# Patient Record
Sex: MALE | Race: WHITE | Employment: FULL TIME | URBAN - METROPOLITAN AREA
[De-identification: names, ages, dates, MRNs, and addresses within clinical notes are randomized per-mention and may not be internally consistent; named-entity substitution may affect disease eponyms.]

---

## 2017-08-04 ENCOUNTER — APPOINTMENT (OUTPATIENT)
Dept: CT IMAGING | Age: 22
End: 2017-08-04
Attending: PHYSICIAN ASSISTANT
Payer: SELF-PAY

## 2017-08-04 ENCOUNTER — HOSPITAL ENCOUNTER (OUTPATIENT)
Age: 22
Setting detail: OBSERVATION
Discharge: HOME | End: 2017-08-05
Attending: EMERGENCY MEDICINE | Admitting: SURGERY
Payer: SELF-PAY

## 2017-08-04 DIAGNOSIS — K81.0 CHOLECYSTITIS, ACUTE: Primary | ICD-10-CM

## 2017-08-04 DIAGNOSIS — D72.829 LEUKOCYTOSIS, UNSPECIFIED TYPE: ICD-10-CM

## 2017-08-04 PROBLEM — K81.9 CHOLECYSTITIS: Status: ACTIVE | Noted: 2017-08-04

## 2017-08-04 LAB
ALBUMIN SERPL BCP-MCNC: 4.6 G/DL (ref 3.4–5)
ALBUMIN/GLOB SERPL: 1.5 {RATIO} (ref 0.8–1.7)
ALP SERPL-CCNC: 63 U/L (ref 45–117)
ALT SERPL-CCNC: 23 U/L (ref 16–61)
AMPHET UR QL SCN: NEGATIVE
ANION GAP BLD CALC-SCNC: 18 MMOL/L (ref 3–18)
APPEARANCE UR: CLEAR
AST SERPL W P-5'-P-CCNC: 23 U/L (ref 15–37)
BACTERIA URNS QL MICRO: NEGATIVE /HPF
BARBITURATES UR QL SCN: NEGATIVE
BASOPHILS # BLD AUTO: 0 K/UL (ref 0–0.06)
BASOPHILS # BLD: 0 % (ref 0–2)
BENZODIAZ UR QL: NEGATIVE
BILIRUB SERPL-MCNC: 0.6 MG/DL (ref 0.2–1)
BILIRUB UR QL: NEGATIVE
BUN SERPL-MCNC: 11 MG/DL (ref 7–18)
BUN/CREAT SERPL: 7 (ref 12–20)
CALCIUM SERPL-MCNC: 9.4 MG/DL (ref 8.5–10.1)
CANNABINOIDS UR QL SCN: POSITIVE
CHLORIDE SERPL-SCNC: 103 MMOL/L (ref 100–108)
CK MB CFR SERPL CALC: 0.4 % (ref 0–4)
CK MB SERPL-MCNC: 1.4 NG/ML (ref 5–25)
CK SERPL-CCNC: 337 U/L (ref 39–308)
CO2 SERPL-SCNC: 23 MMOL/L (ref 21–32)
COCAINE UR QL SCN: NEGATIVE
COLOR UR: YELLOW
CREAT SERPL-MCNC: 1.5 MG/DL (ref 0.6–1.3)
DIFFERENTIAL METHOD BLD: ABNORMAL
EOSINOPHIL # BLD: 0 K/UL (ref 0–0.4)
EOSINOPHIL NFR BLD: 0 % (ref 0–5)
EPITH CASTS URNS QL MICRO: NORMAL /LPF (ref 0–5)
ERYTHROCYTE [DISTWIDTH] IN BLOOD BY AUTOMATED COUNT: 12.4 % (ref 11.6–14.5)
GLOBULIN SER CALC-MCNC: 3.1 G/DL (ref 2–4)
GLUCOSE SERPL-MCNC: 142 MG/DL (ref 74–99)
GLUCOSE UR STRIP.AUTO-MCNC: NEGATIVE MG/DL
HCT VFR BLD AUTO: 39.2 % (ref 36–48)
HDSCOM,HDSCOM: ABNORMAL
HGB BLD-MCNC: 14.2 G/DL (ref 13–16)
HGB UR QL STRIP: NEGATIVE
KETONES UR QL STRIP.AUTO: 40 MG/DL
LACTATE SERPL-SCNC: 1.1 MMOL/L (ref 0.4–2)
LACTATE SERPL-SCNC: 6.6 MMOL/L (ref 0.4–2)
LEUKOCYTE ESTERASE UR QL STRIP.AUTO: ABNORMAL
LIPASE SERPL-CCNC: 64 U/L (ref 73–393)
LYMPHOCYTES # BLD AUTO: 4 % (ref 21–52)
LYMPHOCYTES # BLD: 0.7 K/UL (ref 0.9–3.6)
MAGNESIUM SERPL-MCNC: 1.5 MG/DL (ref 1.6–2.6)
MCH RBC QN AUTO: 32.1 PG (ref 24–34)
MCHC RBC AUTO-ENTMCNC: 36.2 G/DL (ref 31–37)
MCV RBC AUTO: 88.5 FL (ref 74–97)
METHADONE UR QL: NEGATIVE
MONOCYTES # BLD: 1.9 K/UL (ref 0.05–1.2)
MONOCYTES NFR BLD AUTO: 10 % (ref 3–10)
NEUTS SEG # BLD: 16.2 K/UL (ref 1.8–8)
NEUTS SEG NFR BLD AUTO: 86 % (ref 40–73)
NITRITE UR QL STRIP.AUTO: NEGATIVE
OPIATES UR QL: POSITIVE
PCP UR QL: NEGATIVE
PH UR STRIP: 8.5 [PH] (ref 5–8)
PLATELET # BLD AUTO: 258 K/UL (ref 135–420)
PMV BLD AUTO: 9.7 FL (ref 9.2–11.8)
POTASSIUM SERPL-SCNC: 3.6 MMOL/L (ref 3.5–5.5)
PROT SERPL-MCNC: 7.7 G/DL (ref 6.4–8.2)
PROT UR STRIP-MCNC: 30 MG/DL
RBC # BLD AUTO: 4.43 M/UL (ref 4.7–5.5)
RBC #/AREA URNS HPF: NEGATIVE /HPF (ref 0–5)
SODIUM SERPL-SCNC: 144 MMOL/L (ref 136–145)
SP GR UR REFRACTOMETRY: 1.02 (ref 1–1.03)
TROPONIN I SERPL-MCNC: <0.02 NG/ML (ref 0–0.06)
UROBILINOGEN UR QL STRIP.AUTO: 1 EU/DL (ref 0.2–1)
WBC # BLD AUTO: 18.7 K/UL (ref 4.6–13.2)
WBC URNS QL MICRO: NORMAL /HPF (ref 0–5)

## 2017-08-04 PROCEDURE — 99285 EMERGENCY DEPT VISIT HI MDM: CPT

## 2017-08-04 PROCEDURE — 83605 ASSAY OF LACTIC ACID: CPT | Performed by: PHYSICIAN ASSISTANT

## 2017-08-04 PROCEDURE — 81001 URINALYSIS AUTO W/SCOPE: CPT | Performed by: PHYSICIAN ASSISTANT

## 2017-08-04 PROCEDURE — 83735 ASSAY OF MAGNESIUM: CPT | Performed by: PHYSICIAN ASSISTANT

## 2017-08-04 PROCEDURE — 96367 TX/PROPH/DG ADDL SEQ IV INF: CPT

## 2017-08-04 PROCEDURE — 74011250636 HC RX REV CODE- 250/636: Performed by: PHYSICIAN ASSISTANT

## 2017-08-04 PROCEDURE — 74011636320 HC RX REV CODE- 636/320: Performed by: EMERGENCY MEDICINE

## 2017-08-04 PROCEDURE — 83690 ASSAY OF LIPASE: CPT | Performed by: PHYSICIAN ASSISTANT

## 2017-08-04 PROCEDURE — 96365 THER/PROPH/DIAG IV INF INIT: CPT

## 2017-08-04 PROCEDURE — 93005 ELECTROCARDIOGRAM TRACING: CPT

## 2017-08-04 PROCEDURE — 82550 ASSAY OF CK (CPK): CPT | Performed by: PHYSICIAN ASSISTANT

## 2017-08-04 PROCEDURE — 74011000258 HC RX REV CODE- 258: Performed by: PHYSICIAN ASSISTANT

## 2017-08-04 PROCEDURE — 96361 HYDRATE IV INFUSION ADD-ON: CPT

## 2017-08-04 PROCEDURE — 85025 COMPLETE CBC W/AUTO DIFF WBC: CPT | Performed by: PHYSICIAN ASSISTANT

## 2017-08-04 PROCEDURE — 74177 CT ABD & PELVIS W/CONTRAST: CPT

## 2017-08-04 PROCEDURE — 96375 TX/PRO/DX INJ NEW DRUG ADDON: CPT

## 2017-08-04 PROCEDURE — 80053 COMPREHEN METABOLIC PANEL: CPT | Performed by: PHYSICIAN ASSISTANT

## 2017-08-04 PROCEDURE — 80307 DRUG TEST PRSMV CHEM ANLYZR: CPT | Performed by: PHYSICIAN ASSISTANT

## 2017-08-04 PROCEDURE — 87040 BLOOD CULTURE FOR BACTERIA: CPT | Performed by: PHYSICIAN ASSISTANT

## 2017-08-04 RX ORDER — MAGNESIUM SULFATE HEPTAHYDRATE 40 MG/ML
2 INJECTION, SOLUTION INTRAVENOUS ONCE
Status: COMPLETED | OUTPATIENT
Start: 2017-08-04 | End: 2017-08-04

## 2017-08-04 RX ORDER — ONDANSETRON 2 MG/ML
4 INJECTION INTRAMUSCULAR; INTRAVENOUS
Status: DISCONTINUED | OUTPATIENT
Start: 2017-08-04 | End: 2017-08-05 | Stop reason: HOSPADM

## 2017-08-04 RX ORDER — SODIUM CHLORIDE 9 MG/ML
125 INJECTION, SOLUTION INTRAVENOUS CONTINUOUS
Status: DISCONTINUED | OUTPATIENT
Start: 2017-08-04 | End: 2017-08-05 | Stop reason: HOSPADM

## 2017-08-04 RX ORDER — ONDANSETRON 2 MG/ML
4 INJECTION INTRAMUSCULAR; INTRAVENOUS
Status: COMPLETED | OUTPATIENT
Start: 2017-08-04 | End: 2017-08-04

## 2017-08-04 RX ORDER — MORPHINE SULFATE 2 MG/ML
2 INJECTION, SOLUTION INTRAMUSCULAR; INTRAVENOUS
Status: DISCONTINUED | OUTPATIENT
Start: 2017-08-04 | End: 2017-08-05 | Stop reason: HOSPADM

## 2017-08-04 RX ORDER — HYDROMORPHONE HYDROCHLORIDE 1 MG/ML
1 INJECTION, SOLUTION INTRAMUSCULAR; INTRAVENOUS; SUBCUTANEOUS
Status: COMPLETED | OUTPATIENT
Start: 2017-08-04 | End: 2017-08-04

## 2017-08-04 RX ADMIN — SODIUM CHLORIDE 1000 ML: 900 INJECTION, SOLUTION INTRAVENOUS at 18:39

## 2017-08-04 RX ADMIN — MAGNESIUM SULFATE HEPTAHYDRATE 2 G: 40 INJECTION, SOLUTION INTRAVENOUS at 21:04

## 2017-08-04 RX ADMIN — ONDANSETRON 4 MG: 2 INJECTION INTRAMUSCULAR; INTRAVENOUS at 18:13

## 2017-08-04 RX ADMIN — SODIUM CHLORIDE 1000 ML: 900 INJECTION, SOLUTION INTRAVENOUS at 18:14

## 2017-08-04 RX ADMIN — SODIUM CHLORIDE 1000 ML: 900 INJECTION, SOLUTION INTRAVENOUS at 21:04

## 2017-08-04 RX ADMIN — IOPAMIDOL 100 ML: 612 INJECTION, SOLUTION INTRAVENOUS at 21:40

## 2017-08-04 RX ADMIN — PIPERACILLIN SODIUM,TAZOBACTAM SODIUM 3.38 G: 3; .375 INJECTION, POWDER, FOR SOLUTION INTRAVENOUS at 21:05

## 2017-08-04 RX ADMIN — HYDROMORPHONE HYDROCHLORIDE 1 MG: 1 INJECTION, SOLUTION INTRAMUSCULAR; INTRAVENOUS; SUBCUTANEOUS at 18:13

## 2017-08-04 NOTE — IP AVS SNAPSHOT
Radha Gaspar 
 
 
 [address] [place] [id number] 
[phone] Patient: Jean Crowe MRN: [id number] :1995 You are allergic to the following No active allergies Recent Documentation Height Weight BMI Smoking Status 1.854 m (89 %, Z= 1.21)* 72.6 kg (57 %, Z= 0.17)* 21.11 kg/m2 (24 %, Z= -0.69)* Never Smoker *Growth percentiles are based on CDC 2-20 Years data. Unresulted Labs Order Current Status CULTURE, BLOOD Preliminary result Emergency Contacts Name Discharge Info Relation Home Work Mobile Preston CAREGIVER [3] Parent [1] [phone] About your hospitalization You were admitted on:  2017 You last received care in the:  [address] [place] [address] You were discharged on:  2017 Unit phone number:  [phone] Why you were hospitalized Your primary diagnosis was:  Not on File Your diagnoses also included:  Cholecystitis Providers Seen During Your Hospitalizations Provider Role Specialty Primary office phone Latonya Florez MD Attending Provider Emergency Medicine [phone] Wiley Pena DO Attending Provider Colon and Rectal Surgery [phone] Your Primary Care Physician (PCP) Primary Care Physician Office Phone Office Fax OTHER, PHYS ** None ** ** None ** Follow-up Information Follow up With Details Comments Contact Info Wiley Pena DO Call to make plans for further care [address] [place] [address] 
[phone] Current Discharge Medication List  
  
START taking these medications Dose & Instructions Dispensing Information Comments Morning Noon Evening Bedtime  
 amoxicillin-clavulanate 875-125 mg per tablet Commonly known as:  AUGMENTIN Your last dose was: Your next dose is:    
   
   
 Dose:  1 Tab Take 1 Tab by mouth two (2) times a day for 10 days. Indications: intra-abdominal infection Quantity:  20 Tab Refills:  0 HYDROcodone-acetaminophen 7.5-325 mg per tablet Commonly known as:  Dayo Vinson Your last dose was: Your next dose is:    
   
   
 Dose:  1 Tab Take 1 Tab by mouth every four (4) hours for 15 doses. Max Daily Amount: 6 Tabs. Quantity:  15 Tab Refills:  0 CONTINUE these medications which have NOT CHANGED Dose & Instructions Dispensing Information Comments Morning Noon Evening Bedtime  
 acetaminophen-codeine 300-30 mg per tablet Commonly known as:  TYLENOL-CODEINE #3 Your last dose was: Your next dose is:    
   
   
 Dose:  1 Tab Take 1 tablet by mouth every six (6) hours as needed for Pain. Quantity:  12 tablet Refills:  0  
     
   
   
   
  
 ADDERALL PO Your last dose was: Your next dose is: Take  by mouth. Refills:  0 Where to Get Your Medications Information on where to get these meds will be given to you by the nurse or doctor. ! Ask your nurse or doctor about these medications  
  amoxicillin-clavulanate 875-125 mg per tablet HYDROcodone-acetaminophen 7.5-325 mg per tablet Discharge Instructions Post-Operative Discharge Instructions Rogelio Ram DO, JAZMIN, [facility] [address] [place] [address] [facility] 
[phone] Patient: Diane Calero MRN: [id number]  CSN: [id number] YOB: 1995  Age: 25 y.o. Sex: male DOA: 8/4/2017 LOS:  LOS: 0 days   Discharge Date:   
 
Acute Diagnoses: 
Abdominal Pain NOS Chronic Medical Diagnoses: 
Problem List as of 8/5/2017  Date Reviewed: 8/5/2017 Codes Class Noted - Resolved Cholecystitis ICD-10-CM: K81.9 ICD-9-CM: 575.10  8/4/2017 - Present Diet 1. Resume prior to surgery diet as tolerated. Medications 1. It is important to take your medications exactly as they are prescribed. 2. Keep your medication in the bottles provided by the pharmacist, and keep a list of the medication names, dosages, and times they should be taken in your wallet. Call 911 anytime you think you may need emergency care. For example, call if: 
· You passed out (lost consciousness). · You have severe trouble breathing. · You have sudden chest pain and shortness of breath. Notify your Surgeon for any of the followin. Fever, chills, nausea, vomiting, severe abdominal pain or bleeding. 2. If you experience any redness or discharge or sign of infection. 3. Persistent nausea lasting more than 24 hours. If you are unable to reach your Surgeon for any of the symptoms above, you should proceed directly to the nearest Emergency Department. Post-Operative Appointment Information Call Dr. Rubén San office at [phone] to schedule an  office visit in two (2) weeks. If any questions or concerns arise, call your Surgeon at [phone]. DISCHARGE SUMMARY from Nurse The following personal items are in your possession at time of discharge: 
 
Dental Appliances: None Visual Aid: None Home Medications: None Jewelry: None Clothing: Shorts, Shirt, Footwear, Undergarments, With patient Other Valuables: Cell Phone PATIENT INSTRUCTIONS: 
 
 
F-face looks uneven A-arms unable to move or move unevenly S-speech slurred or non-existent T-time-call 911 as soon as signs and symptoms begin-DO NOT go Back to bed or wait to see if you get better-TIME IS BRAIN. Warning Signs of HEART ATTACK Call 911 if you have these symptoms: 
? Chest discomfort. Most heart attacks involve discomfort in the center of the chest that lasts more than a few minutes, or that goes away and comes back. It can feel like uncomfortable pressure, squeezing, fullness, or pain. ? Discomfort in other areas of the upper body. Symptoms can include pain or discomfort in one or both arms, the back, neck, jaw, or stomach. ? Shortness of breath with or without chest discomfort. ? Other signs may include breaking out in a cold sweat, nausea, or lightheadedness. Don't wait more than five minutes to call [address]! Fast action can save your life. Calling 911 is almost always the fastest way to get lifesaving treatment. Emergency Medical Services staff can begin treatment when they arrive  up to an hour sooner than if someone gets to the hospital by car. The discharge information has been reviewed with the patient and parent. The patient and parent verbalized understanding. Discharge medications reviewed with the patient and appropriate educational materials and side effects teaching were provided. Patient armband removed and shredded Discharge Instructions Attachments/References ABDOMINAL PAIN (ENGLISH) Discharge Orders None [facility] Announcement We are excited to announce that we are making your provider's discharge notes available to you in [facility]. You will see these notes when they are completed and signed by the physician that discharged you from your recent hospital stay. If you have any questions or concerns about any information you see in [facility], please call the Health Information Department where you were seen or reach out to your Primary Care Provider for more information about your plan of care. Introducing [facility] & [facility] SERVICES!    
 [employer] introduces [facility] patient portal. Now you can access parts of your medical record, email your doctor's office, and request medication refills online. 1. In your internet browser, go to [email or web address] 2. Click on the First Time User? Click Here link in the Sign In box. You will see the New Member Sign Up page. 3. Enter your [employer] Access Code exactly as it appears below. You will not need to use this code after youve completed the sign-up process. If you do not sign up before the expiration date, you must request a new code. · [employer] Access Code: [email or web address] Expires: 11/3/2017  4:27 PM 
 
4. Enter the last four digits of your Social Security Number (xxxx) and Date of Birth (mm/dd/yyyy) as indicated and click Submit. You will be taken to the next sign-up page. 5. Create a [employer] ID. This will be your [employer] login ID and cannot be changed, so think of one that is secure and easy to remember. 6. Create a [employer] password. You can change your password at any time. 7. Enter your Password Reset Question and Answer. This can be used at a later time if you forget your password. 8. Enter your e-mail address. You will receive e-mail notification when new information is available in [address] 9. Click Sign Up. You can now view and download portions of your medical record. 10. Click the Download Summary menu link to download a portable copy of your medical information. If you have questions, please visit the Frequently Asked Questions section of the [employer] website. Remember, [employer] is NOT to be used for urgent needs. For medical emergencies, dial 911. Now available from your iPhone and Android! General Information Please provide this summary of care documentation to your next provider. Patient Signature:  ____________________________________________________________ Date:  ____________________________________________________________  
  
Poncho Brake  Provider Signature: ____________________________________________________________ Date:  ____________________________________________________________ More Information Abdominal Pain: Care Instructions Your Care Instructions Abdominal pain has many possible causes. Some aren't serious and get better on their own in a few days. Others need more testing and treatment. If your pain continues or gets worse, you need to be rechecked and may need more tests to find out what is wrong. You may need surgery to correct the problem. Don't ignore new symptoms, such as fever, nausea and vomiting, urination problems, pain that gets worse, and dizziness. These may be signs of a more serious problem. Your doctor may have recommended a follow-up visit in the next 8 to 12 hours. If you are not getting better, you may need more tests or treatment. The doctor has checked you carefully, but problems can develop later. If you notice any problems or new symptoms, get medical treatment right away. Follow-up care is a key part of your treatment and safety. Be sure to make and go to all appointments, and call your doctor if you are having problems. It's also a good idea to know your test results and keep a list of the medicines you take. How can you care for yourself at home? · Rest until you feel better. · To prevent dehydration, drink plenty of fluids, enough so that your urine is light yellow or clear like water. Choose water and other caffeine-free clear liquids until you feel better. If you have kidney, heart, or liver disease and have to limit fluids, talk with your doctor before you increase the amount of fluids you drink. · If your stomach is upset, eat mild foods, such as rice, dry toast or crackers, bananas, and applesauce. Try eating several small meals instead of two or three large ones. · Wait until 48 hours after all symptoms have gone away before you have spicy foods, alcohol, and drinks that contain caffeine. · Do not eat foods that are high in fat. · Avoid anti-inflammatory medicines such as aspirin, ibuprofen (Advil, Motrin), and naproxen (Aleve). These can cause stomach upset. Talk to your doctor if you take daily aspirin for another health problem. When should you call for help? Call 911 anytime you think you may need emergency care. For example, call if: 
· You passed out (lost consciousness). · You pass maroon or very bloody stools. · You vomit blood or what looks like coffee grounds. · You have new, severe belly pain. Call your doctor now or seek immediate medical care if: 
· Your pain gets worse, especially if it becomes focused in one area of your belly. · You have a new or higher fever. · Your stools are black and look like tar, or they have streaks of blood. · You have unexpected vaginal bleeding. · You have symptoms of a urinary tract infection. These may include: 
¨ Pain when you urinate. ¨ Urinating more often than usual. 
¨ Blood in your urine. · You are dizzy or lightheaded, or you feel like you may faint. Watch closely for changes in your health, and be sure to contact your doctor if: 
· You are not getting better after 1 day (24 hours). Where can you learn more? Go to http://nallely-luisana.info/. Enter D173 in the search box to learn more about \"Abdominal Pain: Care Instructions. \" Current as of: March 20, 2017 Content Version: 11.3 © 7634-9065 [employer]. Care instructions adapted under license by [employer] (which disclaims liability or warranty for this information). If you have questions about a medical condition or this instruction, always ask your healthcare professional. [place] [address] any warranty or liability for your use of this information.

## 2017-08-04 NOTE — ED PROVIDER NOTES
HPI Comments: 6:03 PM  Werner Doyle is a 25 y.o. male who presents to the emergency department C/O LUQ abdominal pain onset 5 hrs ago, which started at work (pt works for a [employer]) after eating. Pt states \"I feel dehydrated\". Associated symptoms include nausea, vomiting, diaphoresis. Last BM was earlier today. No medical problems. Patient denies EtOH, PSHx, Hx of kidney stones or regular use of Aspirin/Motrin. Of note, pt was seen at [facility] earlier today and was sent to this ED for further evaluation. Pt denies any hx of abdominal surgery. Denies diarrhea, fever or any other symptoms or complaints. Patient is a 25 y.o. male presenting with abdominal pain. The history is provided by the patient and a parent. No  was used. Abdominal Pain    This is a new problem. The current episode started 3 to 5 hours ago. The pain is associated with vomiting and eating. The pain is located in the LUQ. Associated symptoms include nausea and vomiting. Pertinent negatives include no fever and no diarrhea. Past Medical History:   Diagnosis Date    ADHD (attention deficit hyperactivity disorder)        No past surgical history on file. No family history on file. Social History     Social History    Marital status: SINGLE     Spouse name: N/A    Number of children: N/A    Years of education: N/A     Occupational History    Not on file. Social History Main Topics    Smoking status: Never Smoker    Smokeless tobacco: Not on file    Alcohol use Not on file    Drug use: Not on file    Sexual activity: Not on file     Other Topics Concern    Not on file     Social History Narrative    No narrative on file         ALLERGIES: Review of patient's allergies indicates no known allergies. Review of Systems   Constitutional: Positive for diaphoresis. Negative for fever. Gastrointestinal: Positive for abdominal pain, nausea and vomiting. Negative for diarrhea.    All other systems reviewed and are negative. Vitals:    08/04/17 2330 08/04/17 2345 08/05/17 0030 08/05/17 0039   BP: 124/65 123/60 123/58 128/67   Pulse: (!) 59 (!) 58 (!) 54 (!) 59   Resp: 13 19 17 16   Temp:    98.2 °F (36.8 °C)   SpO2:  99% 99% 100%   Weight:       Height:                Physical Exam   Constitutional: He is oriented to person, place, and time. He appears well-developed and well-nourished. No distress. Lying on stretcher, pale, + pain distress    HENT:   Head: Normocephalic and atraumatic. Dry mucous membranes    Neck: Normal range of motion. Neck supple. Cardiovascular: Normal rate, regular rhythm, normal heart sounds and intact distal pulses. No murmur heard. Pulmonary/Chest: Effort normal and breath sounds normal. No respiratory distress. He has no wheezes. He has no rales. Abdominal: Soft. Bowel sounds are normal. He exhibits no distension. There is generalized tenderness. There is guarding. There is no rebound and no CVA tenderness. Generalized TTP, max over LUQ    Neurological: He is alert and oriented to person, place, and time. Skin: There is pallor. Psychiatric: He has a normal mood and affect. Judgment normal.   Nursing note and vitals reviewed. RESULTS:    PULSE OXIMETRY NOTE:  Pulse-ox is 100% on RA  Interpretation: Normal    EKG interpretation: (Preliminary)  6:52 PM   57 bpm, Sinus Bradycardia with biphasic T wave, No ST elevation  EKG read by Freda Toribio PA-C at 6:27 PM      CT ABD PELV W CONT   Final Result   IMPRESSION:     Gallbladder wall thickening and periportal edema without radiopaque  cholelithiasis or biliary dilation. Findings could represent acute or chronic cholecystitis. Consider right upper  quadrant ultrasound for further evaluation.     As read by the radiologist.        Labs Reviewed   CBC WITH AUTOMATED DIFF - Abnormal; Notable for the following:        Result Value    WBC 18.7 (*)     RBC 4.43 (*)     NEUTROPHILS 86 (*)     LYMPHOCYTES 4 (*)     ABS. NEUTROPHILS 16.2 (*)     ABS. LYMPHOCYTES 0.7 (*)     ABS. MONOCYTES 1.9 (*)     All other components within normal limits   LIPASE - Abnormal; Notable for the following:     Lipase 64 (*)     All other components within normal limits   METABOLIC PANEL, COMPREHENSIVE - Abnormal; Notable for the following:     Glucose 142 (*)     Creatinine 1.50 (*)     BUN/Creatinine ratio 7 (*)     All other components within normal limits   URINALYSIS W/ RFLX MICROSCOPIC - Abnormal; Notable for the following:     pH (UA) 8.5 (*)     Protein 30 (*)     Ketone 40 (*)     Leukocyte Esterase TRACE (*)     All other components within normal limits   LACTIC ACID - Abnormal; Notable for the following:     Lactic acid 6.6 (*)     All other components within normal limits   DRUG SCREEN, URINE - Abnormal; Notable for the following:     THC (TH-CANNABINOL) POSITIVE (*)     OPIATES POSITIVE (*)     All other components within normal limits   CARDIAC PANEL,(CK, CKMB & TROPONIN) - Abnormal; Notable for the following:      (*)     All other components within normal limits   MAGNESIUM - Abnormal; Notable for the following:     Magnesium 1.5 (*)     All other components within normal limits   CULTURE, BLOOD   LACTIC ACID   URINE MICROSCOPIC ONLY   TYPE & SCREEN       Recent Results (from the past 12 hour(s))   CBC WITH AUTOMATED DIFF    Collection Time: 08/04/17  6:06 PM   Result Value Ref Range    WBC 18.7 (H) 4.6 - 13.2 K/uL    RBC 4.43 (L) 4.70 - 5.50 M/uL    HGB 14.2 13.0 - 16.0 g/dL    HCT 39.2 36.0 - 48.0 %    MCV 88.5 74.0 - 97.0 FL    MCH 32.1 24.0 - 34.0 PG    MCHC 36.2 31.0 - 37.0 g/dL    RDW 12.4 11.6 - 14.5 %    PLATELET 334 223 - 599 K/uL    MPV 9.7 9.2 - 11.8 FL    NEUTROPHILS 86 (H) 40 - 73 %    LYMPHOCYTES 4 (L) 21 - 52 %    MONOCYTES 10 3 - 10 %    EOSINOPHILS 0 0 - 5 %    BASOPHILS 0 0 - 2 %    ABS. NEUTROPHILS 16.2 (H) 1.8 - 8.0 K/UL    ABS. LYMPHOCYTES 0.7 (L) 0.9 - 3.6 K/UL    ABS.  MONOCYTES 1.9 (H) 0.05 - 1.2 K/UL    ABS. EOSINOPHILS 0.0 0.0 - 0.4 K/UL    ABS. BASOPHILS 0.0 0.0 - 0.06 K/UL    DF AUTOMATED     LIPASE    Collection Time: 08/04/17  6:06 PM   Result Value Ref Range    Lipase 64 (L) 73 - 423 U/L   METABOLIC PANEL, COMPREHENSIVE    Collection Time: 08/04/17  6:06 PM   Result Value Ref Range    Sodium 144 136 - 145 mmol/L    Potassium 3.6 3.5 - 5.5 mmol/L    Chloride 103 100 - 108 mmol/L    CO2 23 21 - 32 mmol/L    Anion gap 18 3.0 - 18 mmol/L    Glucose 142 (H) 74 - 99 mg/dL    BUN 11 7.0 - 18 MG/DL    Creatinine 1.50 (H) 0.6 - 1.3 MG/DL    BUN/Creatinine ratio 7 (L) 12 - 20      GFR est AA >60 >60 ml/min/1.73m2    GFR est non-AA >60 >60 ml/min/1.73m2    Calcium 9.4 8.5 - 10.1 MG/DL    Bilirubin, total 0.6 0.2 - 1.0 MG/DL    ALT (SGPT) 23 16 - 61 U/L    AST (SGOT) 23 15 - 37 U/L    Alk.  phosphatase 63 45 - 117 U/L    Protein, total 7.7 6.4 - 8.2 g/dL    Albumin 4.6 3.4 - 5.0 g/dL    Globulin 3.1 2.0 - 4.0 g/dL    A-G Ratio 1.5 0.8 - 1.7     URINALYSIS W/ RFLX MICROSCOPIC    Collection Time: 08/04/17  6:06 PM   Result Value Ref Range    Color YELLOW      Appearance CLEAR      Specific gravity 1.025 1.005 - 1.030      pH (UA) 8.5 (H) 5.0 - 8.0      Protein 30 (A) NEG mg/dL    Glucose NEGATIVE  NEG mg/dL    Ketone 40 (A) NEG mg/dL    Bilirubin NEGATIVE  NEG      Blood NEGATIVE  NEG      Urobilinogen 1.0 0.2 - 1.0 EU/dL    Nitrites NEGATIVE  NEG      Leukocyte Esterase TRACE (A) NEG     LACTIC ACID    Collection Time: 08/04/17  6:06 PM   Result Value Ref Range    Lactic acid 6.6 (HH) 0.4 - 2.0 MMOL/L   DRUG SCREEN, URINE    Collection Time: 08/04/17  6:06 PM   Result Value Ref Range    BENZODIAZEPINE NEGATIVE  NEG      BARBITURATES NEGATIVE  NEG      THC (TH-CANNABINOL) POSITIVE (A) NEG      OPIATES POSITIVE (A) NEG      PCP(PHENCYCLIDINE) NEGATIVE  NEG      COCAINE NEGATIVE  NEG      AMPHETAMINES NEGATIVE  NEG      METHADONE NEGATIVE  NEG      HDSCOM (NOTE)    CARDIAC PANEL,(CK, CKMB & TROPONIN)    Collection Time: 08/04/17  6:06 PM   Result Value Ref Range     (H) 39 - 308 U/L    CK - MB 1.4 <3.6 ng/ml    CK-MB Index 0.4 0.0 - 4.0 %    Troponin-I, Qt. <0.02 0.00 - 0.06 NG/ML   MAGNESIUM    Collection Time: 08/04/17  6:06 PM   Result Value Ref Range    Magnesium 1.5 (L) 1.6 - 2.6 mg/dL   URINE MICROSCOPIC ONLY    Collection Time: 08/04/17  6:06 PM   Result Value Ref Range    WBC 4 to 10 0 - 5 /hpf    RBC NEGATIVE  0 - 5 /hpf    Epithelial cells FEW 0 - 5 /lpf    Bacteria NEGATIVE  NEG /hpf   EKG, 12 LEAD, INITIAL    Collection Time: 08/04/17  6:27 PM   Result Value Ref Range    Ventricular Rate 57 BPM    Atrial Rate 59 BPM    QRS Duration 90 ms    Q-T Interval 446 ms    QTC Calculation (Bezet) 434 ms    Calculated P Axis 49 degrees    Calculated R Axis 78 degrees    Calculated T Axis 50 degrees    Diagnosis       Sinus bradycardia with AV dissociation and Junctional rhythm with   sinus/atrial capture  Abnormal ECG  No previous ECGs available     LACTIC ACID    Collection Time: 08/04/17 10:00 PM   Result Value Ref Range    Lactic acid 1.1 0.4 - 2.0 MMOL/L       MDM  Number of Diagnoses or Management Options  Cholecystitis, acute:   Leukocytosis, unspecified type:   Diagnosis management comments: DDX Considered   Pancreatitis, cholecystitis, Diverticulitis, constipation, gastroenteritis, appendicitis, SBO, LBO / fecal impaction, colitis, IBS, inflammatory bowel disease (Crohn's or ulcerative colitis), mesenteric ischemia, hernia (femoral, inguinal, umbilical), renal calculi         Amount and/or Complexity of Data Reviewed  Clinical lab tests: ordered and reviewed  Tests in the radiology section of CPT®: ordered and reviewed (CT abd/pelv)  Tests in the medicine section of CPT®: ordered and reviewed (EKG)  Discuss the patient with other providers: yes (General Surgery - Devonte Singh DO  Attending physician - Shiraz Rachel MD)  Independent visualization of images, tracings, or specimens: yes (EKG)      ED Course MEDICATIONS GIVEN:  Medications   piperacillin-tazobactam (ZOSYN) 3.375 g in 0.9% sodium chloride (MBP/ADV) 100 mL MBP (not administered)   0.9% sodium chloride infusion (125 mL/hr IntraVENous New Bag 8/5/17 0059)   morphine injection 2 mg (not administered)   ondansetron (ZOFRAN) injection 4 mg (not administered)   sodium chloride 0.9 % bolus infusion 1,000 mL (0 mL IntraVENous IV Completed 8/4/17 1919)   ondansetron (ZOFRAN) injection 4 mg (4 mg IntraVENous Given 8/4/17 1813)   HYDROmorphone (PF) (DILAUDID) injection 1 mg (1 mg IntraVENous Given 8/4/17 1813)   sodium chloride 0.9 % bolus infusion 1,000 mL (0 mL IntraVENous IV Completed 8/4/17 1919)   sodium chloride 0.9 % bolus infusion 1,000 mL (0 mL IntraVENous IV Completed 8/4/17 2204)   piperacillin-tazobactam (ZOSYN) 3.375 g in 0.9% sodium chloride (MBP/ADV) 100 mL MBP (0 g IntraVENous IV Completed 8/4/17 2135)   magnesium sulfate 2 g/50 ml IVPB (premix or compounded) (0 g IntraVENous IV Completed 8/4/17 2204)   iopamidol (ISOVUE 300) 61 % contrast injection 100 mL (100 mL IntraVENous Given 8/4/17 2140)       Procedures    PROGRESS NOTE:   6:10 PM  Initial assesment performed. Written by Jorge Gupta ED Scribe, as dictated by . Anne Barrett PA-C. PROGRESS NOTE:  6:38 PM  Discussed pt's hx and available diagnostic and imaging results with Licha Jang MD, the pt's attending. Reviewed care plans and Licha Jang MD is in agreement with the plan of care. Licha Jang MD  advises an CTA Abd, Pelv. Written by Jorge Gupta ED Scribe, as dictated by Licha Jang MD.        PROGRESS NOTE:  7:37 PM  Discussed pt's hx and available diagnostic and imaging results with Licha Jang MD, the pt's attending. Reviewed care plans and Licha Jang MD is in agreement with the plan of care.  Reviewed EKG, found no ST elevation and sinus bradycardia with biphasic T wave  Written by Jorge Obrien, as dictated by Licha Jang MD.    PROGRESS NOTE:   8:52 PM  Pt has been re-examined by Georgie Murrell PA-C and Bradly Thakur MD.    CONSULT NOTE:   10:31 PM  Georgie Murrell PA-C spoke with Yann Cruz DO (General surgery)   Discussed pt's hx, disposition, and available diagnostic and imaging results over the telephone. Reviewed care plans. Consulting physician agrees with plans as outlined. Yann Cruz DO (General surgery) will admit the pt to MEDICAL. ADMISSION NOTE:  10:31 PM  Patient is being admitted to the hospital by Yann Cruz DO (General surgery). The results of their tests and reasons for their admission have been discussed with them and/or available family. They convey agreement and understanding for the need to be admitted and for their admission diagnosis. Written by Leo Gusman ED Scribe, as dictated by Georgie Murrell PA-C.    CONDITIONS ON ADMISSION:  Deep Vein Thrombosis is not present at the time of admission. Thrombosis is not present at the time of admission. Urinary Tract Infection is not present at the time of admission. Pneumonia is not present at the time of admission. MRSA is not present at the time of admission. Wound infection is not present at the time of admission. Pressure Ulcer is not present at the time of admission. CLINICAL IMPRESSION  1. Cholecystitis, acute    2. Leukocytosis, unspecified type        ADMIT TO MEDICAL    ATTESTATION:  This note is prepared by Leo Gusman, acting as a Scribe for Georgie Murrell PA-C on 6:10 PM on 8/4/2017 . Georgie Murrell PA-C: The scribe's documentation has been prepared under my direction and personally reviewed by me in its entirety.

## 2017-08-04 NOTE — ED TRIAGE NOTES
[facility] abd  pain since 1 pm today. Pain had sudden onset, which sent patient to floor. Pt had several episodes of N/V. No diarrhea.

## 2017-08-05 ENCOUNTER — APPOINTMENT (OUTPATIENT)
Dept: ULTRASOUND IMAGING | Age: 22
End: 2017-08-05
Attending: SURGERY
Payer: SELF-PAY

## 2017-08-05 VITALS
DIASTOLIC BLOOD PRESSURE: 81 MMHG | WEIGHT: 160 LBS | OXYGEN SATURATION: 100 % | TEMPERATURE: 98.2 F | HEART RATE: 52 BPM | SYSTOLIC BLOOD PRESSURE: 120 MMHG | RESPIRATION RATE: 16 BRPM | HEIGHT: 73 IN | BODY MASS INDEX: 21.2 KG/M2

## 2017-08-05 LAB
ALBUMIN SERPL BCP-MCNC: 3.7 G/DL (ref 3.4–5)
ALBUMIN/GLOB SERPL: 1.4 {RATIO} (ref 0.8–1.7)
ALP SERPL-CCNC: 51 U/L (ref 45–117)
ALT SERPL-CCNC: 18 U/L (ref 16–61)
ANION GAP BLD CALC-SCNC: 10 MMOL/L (ref 3–18)
AST SERPL W P-5'-P-CCNC: 24 U/L (ref 15–37)
ATRIAL RATE: 59 BPM
BASOPHILS # BLD AUTO: 0 K/UL (ref 0–0.06)
BASOPHILS # BLD: 0 % (ref 0–2)
BILIRUB SERPL-MCNC: 0.7 MG/DL (ref 0.2–1)
BUN SERPL-MCNC: 8 MG/DL (ref 7–18)
BUN/CREAT SERPL: 8 (ref 12–20)
CALCIUM SERPL-MCNC: 8.4 MG/DL (ref 8.5–10.1)
CALCULATED P AXIS, ECG09: 49 DEGREES
CALCULATED R AXIS, ECG10: 78 DEGREES
CALCULATED T AXIS, ECG11: 50 DEGREES
CHLORIDE SERPL-SCNC: 108 MMOL/L (ref 100–108)
CO2 SERPL-SCNC: 26 MMOL/L (ref 21–32)
CREAT SERPL-MCNC: 1.03 MG/DL (ref 0.6–1.3)
DIAGNOSIS, 93000: NORMAL
DIFFERENTIAL METHOD BLD: ABNORMAL
EOSINOPHIL # BLD: 0 K/UL (ref 0–0.4)
EOSINOPHIL NFR BLD: 1 % (ref 0–5)
ERYTHROCYTE [DISTWIDTH] IN BLOOD BY AUTOMATED COUNT: 12.9 % (ref 11.6–14.5)
GLOBULIN SER CALC-MCNC: 2.7 G/DL (ref 2–4)
GLUCOSE SERPL-MCNC: 92 MG/DL (ref 74–99)
HCT VFR BLD AUTO: 39.9 % (ref 36–48)
HGB BLD-MCNC: 13.7 G/DL (ref 13–16)
LACTATE SERPL-SCNC: 0.7 MMOL/L (ref 0.4–2)
LYMPHOCYTES # BLD AUTO: 23 % (ref 21–52)
LYMPHOCYTES # BLD: 1.7 K/UL (ref 0.9–3.6)
MAGNESIUM SERPL-MCNC: 2.1 MG/DL (ref 1.6–2.6)
MCH RBC QN AUTO: 31.6 PG (ref 24–34)
MCHC RBC AUTO-ENTMCNC: 34.3 G/DL (ref 31–37)
MCV RBC AUTO: 91.9 FL (ref 74–97)
MONOCYTES # BLD: 0.7 K/UL (ref 0.05–1.2)
MONOCYTES NFR BLD AUTO: 9 % (ref 3–10)
NEUTS SEG # BLD: 4.9 K/UL (ref 1.8–8)
NEUTS SEG NFR BLD AUTO: 67 % (ref 40–73)
PLATELET # BLD AUTO: 214 K/UL (ref 135–420)
PMV BLD AUTO: 9.6 FL (ref 9.2–11.8)
POTASSIUM SERPL-SCNC: 3.9 MMOL/L (ref 3.5–5.5)
PROT SERPL-MCNC: 6.4 G/DL (ref 6.4–8.2)
Q-T INTERVAL, ECG07: 446 MS
QRS DURATION, ECG06: 90 MS
QTC CALCULATION (BEZET), ECG08: 434 MS
RBC # BLD AUTO: 4.34 M/UL (ref 4.7–5.5)
SODIUM SERPL-SCNC: 144 MMOL/L (ref 136–145)
VENTRICULAR RATE, ECG03: 57 BPM
WBC # BLD AUTO: 7.4 K/UL (ref 4.6–13.2)

## 2017-08-05 PROCEDURE — 74011000258 HC RX REV CODE- 258: Performed by: PHYSICIAN ASSISTANT

## 2017-08-05 PROCEDURE — 99218 HC RM OBSERVATION: CPT

## 2017-08-05 PROCEDURE — 83735 ASSAY OF MAGNESIUM: CPT | Performed by: SURGERY

## 2017-08-05 PROCEDURE — 36415 COLL VENOUS BLD VENIPUNCTURE: CPT | Performed by: SURGERY

## 2017-08-05 PROCEDURE — 80053 COMPREHEN METABOLIC PANEL: CPT | Performed by: SURGERY

## 2017-08-05 PROCEDURE — 74011250636 HC RX REV CODE- 250/636: Performed by: PHYSICIAN ASSISTANT

## 2017-08-05 PROCEDURE — 77030033269 HC SLV COMPR SCD KNE2 CARD -B

## 2017-08-05 PROCEDURE — 85025 COMPLETE CBC W/AUTO DIFF WBC: CPT | Performed by: SURGERY

## 2017-08-05 PROCEDURE — 83605 ASSAY OF LACTIC ACID: CPT | Performed by: SURGERY

## 2017-08-05 PROCEDURE — 76705 ECHO EXAM OF ABDOMEN: CPT

## 2017-08-05 RX ORDER — HYDROCODONE BITARTRATE AND ACETAMINOPHEN 7.5; 325 MG/1; MG/1
1 TABLET ORAL EVERY 4 HOURS
Qty: 15 TAB | Refills: 0 | Status: SHIPPED | OUTPATIENT
Start: 2017-08-05 | End: 2017-08-08

## 2017-08-05 RX ORDER — AMOXICILLIN AND CLAVULANATE POTASSIUM 875; 125 MG/1; MG/1
1 TABLET, FILM COATED ORAL 2 TIMES DAILY
Qty: 20 TAB | Refills: 0 | Status: SHIPPED | OUTPATIENT
Start: 2017-08-05 | End: 2017-08-15

## 2017-08-05 RX ADMIN — PIPERACILLIN SODIUM,TAZOBACTAM SODIUM 3.38 G: 3; .375 INJECTION, POWDER, FOR SOLUTION INTRAVENOUS at 04:26

## 2017-08-05 RX ADMIN — PIPERACILLIN SODIUM,TAZOBACTAM SODIUM 3.38 G: 3; .375 INJECTION, POWDER, FOR SOLUTION INTRAVENOUS at 08:26

## 2017-08-05 RX ADMIN — SODIUM CHLORIDE 125 ML/HR: 900 INJECTION, SOLUTION INTRAVENOUS at 00:59

## 2017-08-05 NOTE — DISCHARGE SUMMARY
Physician Discharge Summary         Discharge Summary  Rosie Kussmaul, DO, [address], [facility]  [address] [place] [address], [address] [place] [address],  Kenn Cuenca  [phone]    Patient ID:  Aura Gutierrez  [id number]  25 y.o.  1995    Admit Date: 8/4/2017    Discharge Date: 8/5/2017    * Admission Diagnoses: Abdominal Pain NOS possible Cholecystitis    * Discharge Diagnoses: LUQ Abdominal pain   Hospital Problems as of 8/5/2017  Date Reviewed: 8/5/2017          Codes Class Noted - Resolved POA    Cholecystitis ICD-10-CM: K81.9  ICD-9-CM: 575.10  8/4/2017 - Present Unknown               Admission Condition: Fair    * Discharge Condition: good and improved    * Procedures: * No surgery found [facility] Course:   Pt's pain completely resolved and he tolerated a regular diet. All repeat labs were normal.    Consults: None    Significant Diagnostic Studies: radiology: CT scan:  and Ultrasound:     * Disposition: Home    Discharge Medications:   Current Discharge Medication List      START taking these medications    Details   HYDROcodone-acetaminophen (NORCO) 7.5-325 mg per tablet Take 1 Tab by mouth every four (4) hours for 15 doses. Max Daily Amount: 6 Tabs. Qty: 15 Tab, Refills: 0      amoxicillin-clavulanate (AUGMENTIN) 875-125 mg per tablet Take 1 Tab by mouth two (2) times a day for 10 days. Indications: intra-abdominal infection  Qty: 20 Tab, Refills: 0         CONTINUE these medications which have NOT CHANGED    Details   DEXTROAMPHETAMINE/AMPHETAMINE (ADDERALL PO) Take  by mouth. acetaminophen-codeine (TYLENOL-CODEINE #3) 300-30 mg per tablet Take 1 tablet by mouth every six (6) hours as needed for Pain. Qty: 12 tablet, Refills: 0             * Follow-up Care/Patient Instructions:   Activity: no restrictions  Diet: Regular Diet    Follow-up Information     Follow up With Details Comments Contact Info    Rosie Kussmaul, DO Call to make plans for further care [phone]  [phone]              Signed:  Delmy Yoo DO  8/5/2017  4:15 PM

## 2017-08-05 NOTE — DISCHARGE INSTRUCTIONS
Post-Operative Discharge Instructions  Rogelio Ram DO, [address], [facility]  [address] [place] [address], Kenn Simpson  [phone]    Patient: Diane Calero MRN: [id number]  CSN: [id number]    YOB: 1995  Age: 25 y.o. Sex: male    DOA: 2017 LOS:  LOS: 0 days   Discharge Date:      Acute Diagnoses:  Abdominal Pain NOS    Chronic Medical Diagnoses:  Problem List as of 2017  Date Reviewed: 2017          Codes Class Noted - Resolved    Cholecystitis ICD-10-CM: K81.9  ICD-9-CM: 575.10  2017 - Present              Diet  1. Resume prior to surgery diet as tolerated. Medications  1. It is important to take your medications exactly as they are prescribed. 2. Keep your medication in the bottles provided by the pharmacist, and keep a list of the medication names, dosages, and times they should be taken in your wallet. Call 911 anytime you think you may need emergency care. For example, call if:  · You passed out (lost consciousness). · You have severe trouble breathing. · You have sudden chest pain and shortness of breath. Notify your Surgeon for any of the followin. Fever, chills, nausea, vomiting, severe abdominal pain or bleeding. 2. If you experience any redness or discharge or sign of infection. 3. Persistent nausea lasting more than 24 hours. If you are unable to reach your Surgeon for any of the symptoms above, you should proceed directly to the nearest Emergency Department. Post-Operative Appointment Information    Call Dr. Christi Orozco office at [phone] to schedule an  office visit in two (2) weeks. If any questions or concerns arise, call your Surgeon at [phone].         DISCHARGE SUMMARY from Nurse    The following personal items are in your possession at time of discharge:    Dental Appliances: None  Visual Aid: None     Home Medications: None  Jewelry: None  Clothing: Shorts, Shirt, Footwear, Undergarments, With patient  Other Valuables: Cell Phone             PATIENT INSTRUCTIONS:    After general anesthesia or intravenous sedation, for 24 hours or while taking prescription Narcotics:  · Limit your activities  · Do not drive and operate hazardous machinery  · Do not make important personal or business decisions  · Do  not drink alcoholic beverages  · If you have not urinated within 8 hours after discharge, please contact your surgeon on call. Report the following to your surgeon:  · Excessive pain, swelling, redness or odor of or around the surgical area  · Temperature over 100.5  · Nausea and vomiting lasting longer than 4 hours or if unable to take medications  · Any signs of decreased circulation or nerve impairment to extremity: change in color, persistent  numbness, tingling, coldness or increase pain  · Any questions        What to do at Home:  Recommended activity: Activity as tolerated    If you experience any of the following symptoms fever greater than 100.4, increased pain in abdomin, nausea or vomiting, please follow up with ED. *  Please give a list of your current medications to your Primary Care Provider. *  Please update this list whenever your medications are discontinued, doses are      changed, or new medications (including over-the-counter products) are added. *  Please carry medication information at all times in case of emergency situations. These are general instructions for a healthy lifestyle:    No smoking/ No tobacco products/ Avoid exposure to second hand smoke    Surgeon General's Warning:  Quitting smoking now greatly reduces serious risk to your health.     Obesity, smoking, and sedentary lifestyle greatly increases your risk for illness    A healthy diet, regular physical exercise & weight monitoring are important for maintaining a healthy lifestyle    You may be retaining fluid if you have a history of heart failure or if you experience any of the following symptoms:  Weight gain of 3 pounds or more overnight or 5 pounds in a week, increased swelling in our hands or feet or shortness of breath while lying flat in bed. Please call your doctor as soon as you notice any of these symptoms; do not wait until your next office visit. Recognize signs and symptoms of STROKE:    F-face looks uneven    A-arms unable to move or move unevenly    S-speech slurred or non-existent    T-time-call 911 as soon as signs and symptoms begin-DO NOT go       Back to bed or wait to see if you get better-TIME IS BRAIN. Warning Signs of HEART ATTACK     Call 911 if you have these symptoms:   Chest discomfort. Most heart attacks involve discomfort in the center of the chest that lasts more than a few minutes, or that goes away and comes back. It can feel like uncomfortable pressure, squeezing, fullness, or pain.  Discomfort in other areas of the upper body. Symptoms can include pain or discomfort in one or both arms, the back, neck, jaw, or stomach.  Shortness of breath with or without chest discomfort.  Other signs may include breaking out in a cold sweat, nausea, or lightheadedness. Don't wait more than five minutes to call 911 - MINUTES MATTER! Fast action can save your life. Calling 911 is almost always the fastest way to get lifesaving treatment. Emergency Medical Services staff can begin treatment when they arrive -- up to an hour sooner than if someone gets to the hospital by car. The discharge information has been reviewed with the patient and parent. The patient and parent verbalized understanding. Discharge medications reviewed with the patient and appropriate educational materials and side effects teaching were provided.       Patient armband removed and shredded

## 2017-08-05 NOTE — H&P
Gallbladder History and Physical    Subjective:     Patient is a 25 y.o.  male who presents with LEFT upper quadrant abdominal pain. Onset of symptoms was abrupt with rapidly worsening course before presenting to the ER. The pain is located in the LUQ with radiation to left shoulder. Patient describes the pain as sharp and stabbing, continuous, and rated as severe. Additional biliary/liver symptoms include nausea, vomiting. Pancreatic symptoms include none. Previous studies include CT scan showing abnormality in gallbladder bed. Since admission the patient has had complete resolution of his pain and is hungry. Only previous episode of this pain was several years ago after severe vomiting from binge drinking. Recently quit smoking. No NSAID use. Patient Active Problem List    Diagnosis Date Noted    Cholecystitis 08/04/2017     Past Medical History:   Diagnosis Date    ADHD (attention deficit hyperactivity disorder)       No past surgical history on file. Social Hx: Smoker, drinker     No family history on file. Prior to Admission medications    Medication Sig Start Date End Date Taking? Authorizing Provider   DEXTROAMPHETAMINE/AMPHETAMINE (ADDERALL PO) Take  by mouth. Phys Other, MD   acetaminophen-codeine (TYLENOL-CODEINE #3) 300-30 mg per tablet Take 1 tablet by mouth every six (6) hours as needed for Pain.  10/28/14   CINDA Wells     No Known Allergies     Review of Systems:    Constitutional: negative  Eyes: negative  Ears, Nose, Mouth, Throat, and Face: negative  Respiratory: negative  Cardiovascular: negative  Gastrointestinal: negative  Genitourinary:negative  Integument/Breast: negative  Hematologic/Lymphatic: negative  Musculoskeletal:negative  Neurological: negative  Behavioral/Psychiatric: negative  Endocrine: negative  Allergic/Immunologic: negative    Objective:     Patient Vitals for the past 8 hrs:   BP Temp Pulse Resp SpO2   08/05/17 0812 121/56 98.1 °F (36.7 °C) 68 16 99 %   08/05/17 0403 128/72 98.2 °F (36.8 °C) (!) 53 16 100 %       Physical Exam:    Visit Vitals    /56 (BP 1 Location: Left arm, BP Patient Position: At rest)    Pulse 68    Temp 98.1 °F (36.7 °C)    Resp 16    Ht 6' 1\" (1.854 m)    Wt 72.6 kg (160 lb)    SpO2 99%    BMI 21.11 kg/m2     General:  Alert, cooperative, no distress, appears stated age. Head:  Normocephalic, without obvious abnormality, atraumatic. Eyes:  Conjunctivae/corneas clear. PERRL, EOMs intact. Fundi benign   Ears:  Normal TMs and external ear canals both ears. Nose: Nares normal. Septum midline. Mucosa normal. No drainage or sinus tenderness. Throat: Lips, mucosa, and tongue normal. Teeth and gums normal.   Neck: Supple, symmetrical, trachea midline, no adenopathy, thyroid: no enlargement/tenderness/nodules, no carotid bruit and no JVD. Back:   Symmetric, no curvature. ROM normal. No CVA tenderness. Lungs:   Clear to auscultation bilaterally. Chest wall:  No tenderness or deformity. Heart:  Regular rate and rhythm, S1, S2 normal, no murmur, click, rub or gallop. Abdomen:   Soft, non-tender. Bowel sounds normal. No masses,  No organomegaly. Genitalia:  Normal male without lesion, discharge or tenderness. Rectal:  Normal tone, normal prostate, no masses or tenderness  Guaiac negative stool. Extremities: Extremities normal, atraumatic, no cyanosis or edema. Pulses: 2+ and symmetric all extremities. Skin: Skin color, texture, turgor normal. No rashes or lesions   Lymph nodes: Cervical, supraclavicular, and axillary nodes normal.   Neurologic: CNII-XII intact. Normal strength, sensation and reflexes throughout.          Imaging and Lab Review:   Recent Results (from the past 24 hour(s))   CBC WITH AUTOMATED DIFF    Collection Time: 08/04/17  6:06 PM   Result Value Ref Range    WBC 18.7 (H) 4.6 - 13.2 K/uL    RBC 4.43 (L) 4.70 - 5.50 M/uL    HGB 14.2 13.0 - 16.0 g/dL    HCT 39.2 36.0 - 48.0 %    MCV 88.5 74.0 - 97.0 FL    MCH 32.1 24.0 - 34.0 PG    MCHC 36.2 31.0 - 37.0 g/dL    RDW 12.4 11.6 - 14.5 %    PLATELET 824 846 - 306 K/uL    MPV 9.7 9.2 - 11.8 FL    NEUTROPHILS 86 (H) 40 - 73 %    LYMPHOCYTES 4 (L) 21 - 52 %    MONOCYTES 10 3 - 10 %    EOSINOPHILS 0 0 - 5 %    BASOPHILS 0 0 - 2 %    ABS. NEUTROPHILS 16.2 (H) 1.8 - 8.0 K/UL    ABS. LYMPHOCYTES 0.7 (L) 0.9 - 3.6 K/UL    ABS. MONOCYTES 1.9 (H) 0.05 - 1.2 K/UL    ABS. EOSINOPHILS 0.0 0.0 - 0.4 K/UL    ABS. BASOPHILS 0.0 0.0 - 0.06 K/UL    DF AUTOMATED     LIPASE    Collection Time: 08/04/17  6:06 PM   Result Value Ref Range    Lipase 64 (L) 73 - 364 U/L   METABOLIC PANEL, COMPREHENSIVE    Collection Time: 08/04/17  6:06 PM   Result Value Ref Range    Sodium 144 136 - 145 mmol/L    Potassium 3.6 3.5 - 5.5 mmol/L    Chloride 103 100 - 108 mmol/L    CO2 23 21 - 32 mmol/L    Anion gap 18 3.0 - 18 mmol/L    Glucose 142 (H) 74 - 99 mg/dL    BUN 11 7.0 - 18 MG/DL    Creatinine 1.50 (H) 0.6 - 1.3 MG/DL    BUN/Creatinine ratio 7 (L) 12 - 20      GFR est AA >60 >60 ml/min/1.73m2    GFR est non-AA >60 >60 ml/min/1.73m2    Calcium 9.4 8.5 - 10.1 MG/DL    Bilirubin, total 0.6 0.2 - 1.0 MG/DL    ALT (SGPT) 23 16 - 61 U/L    AST (SGOT) 23 15 - 37 U/L    Alk.  phosphatase 63 45 - 117 U/L    Protein, total 7.7 6.4 - 8.2 g/dL    Albumin 4.6 3.4 - 5.0 g/dL    Globulin 3.1 2.0 - 4.0 g/dL    A-G Ratio 1.5 0.8 - 1.7     URINALYSIS W/ RFLX MICROSCOPIC    Collection Time: 08/04/17  6:06 PM   Result Value Ref Range    Color YELLOW      Appearance CLEAR      Specific gravity 1.025 1.005 - 1.030      pH (UA) 8.5 (H) 5.0 - 8.0      Protein 30 (A) NEG mg/dL    Glucose NEGATIVE  NEG mg/dL    Ketone 40 (A) NEG mg/dL    Bilirubin NEGATIVE  NEG      Blood NEGATIVE  NEG      Urobilinogen 1.0 0.2 - 1.0 EU/dL    Nitrites NEGATIVE  NEG      Leukocyte Esterase TRACE (A) NEG     CULTURE, BLOOD    Collection Time: 08/04/17  6:06 PM   Result Value Ref Range    Special Requests: NO SPECIAL REQUESTS Culture result: NO GROWTH AFTER 12 HOURS     LACTIC ACID    Collection Time: 08/04/17  6:06 PM   Result Value Ref Range    Lactic acid 6.6 (HH) 0.4 - 2.0 MMOL/L   DRUG SCREEN, URINE    Collection Time: 08/04/17  6:06 PM   Result Value Ref Range    BENZODIAZEPINE NEGATIVE  NEG      BARBITURATES NEGATIVE  NEG      THC (TH-CANNABINOL) POSITIVE (A) NEG      OPIATES POSITIVE (A) NEG      PCP(PHENCYCLIDINE) NEGATIVE  NEG      COCAINE NEGATIVE  NEG      AMPHETAMINES NEGATIVE  NEG      METHADONE NEGATIVE  NEG      HDSCOM (NOTE)    CARDIAC PANEL,(CK, CKMB & TROPONIN)    Collection Time: 08/04/17  6:06 PM   Result Value Ref Range     (H) 39 - 308 U/L    CK - MB 1.4 <3.6 ng/ml    CK-MB Index 0.4 0.0 - 4.0 %    Troponin-I, Qt. <0.02 0.00 - 0.06 NG/ML   MAGNESIUM    Collection Time: 08/04/17  6:06 PM   Result Value Ref Range    Magnesium 1.5 (L) 1.6 - 2.6 mg/dL   URINE MICROSCOPIC ONLY    Collection Time: 08/04/17  6:06 PM   Result Value Ref Range    WBC 4 to 10 0 - 5 /hpf    RBC NEGATIVE  0 - 5 /hpf    Epithelial cells FEW 0 - 5 /lpf    Bacteria NEGATIVE  NEG /hpf   EKG, 12 LEAD, INITIAL    Collection Time: 08/04/17  6:27 PM   Result Value Ref Range    Ventricular Rate 57 BPM    Atrial Rate 59 BPM    QRS Duration 90 ms    Q-T Interval 446 ms    QTC Calculation (Bezet) 434 ms    Calculated P Axis 49 degrees    Calculated R Axis 78 degrees    Calculated T Axis 50 degrees    Diagnosis       Sinus bradycardia with AV dissociation and Junctional rhythm with   sinus/atrial capture  Abnormal ECG  No previous ECGs available     LACTIC ACID    Collection Time: 08/04/17 10:00 PM   Result Value Ref Range    Lactic acid 1.1 0.4 - 2.0 MMOL/L       images and reports reviewed and reviewed  CT, Nursing documentation and I & O    Assessment:     LUQ pain with findings on CT although atypical presentation. DDx gallbladder disease vs gastric etiology    Plan:     Plan now is to rule out gastric etiology due to atypical presentation.   Plan to repeat labs and RUQ ultrasound. If not concerning will DC with close follow up to include EGD and possible surgery. If concerning will likely take to the OR tomorrow for laparoscopic cholecystectomy. Discussed the risks of surgery including infection, hematoma, bleeding, bile duct or bowel injury, recurrence or persistence of symptoms or need for future procedures, and the risks of general anesthetic. The patient understands the risk that the procedure could be an open procedure. The patient and mom understand the risks; any and all questions were answered to the patient's and mom's satisfaction.      Signed By: Marilou Roa DO     August 5, 2017

## 2017-08-05 NOTE — ED NOTES
Bedside shift change report received from  Bernice Chilel RN. Report included the following information SBAR, Kardex, ED Summary, Procedure Summary, Intake/Output, MAR, Accordion, Recent Results, Med Rec Status and Alarm Parameters .

## 2017-08-05 NOTE — ROUTINE PROCESS
Bedside shift change report given to [facility], RN (oncoming nurse) by Silvia Martell RN (offgoing nurse). Report included the following information SBAR, Kardex, ED Summary, Procedure Summary, Intake/Output, MAR, Recent Results and Med Rec Status.

## 2017-08-05 NOTE — ROUTINE PROCESS
TRANSFER - IN REPORT:    Verbal report received from Bre Virgen RN (name) on Aura Gutierrez  being received from ED (unit) for routine progression of care      Report consisted of patients Situation, Background, Assessment and   Recommendations(SBAR). Information from the following report(s) SBAR, Kardex, ED Summary, Procedure Summary, Intake/Output, MAR, Recent Results and Med Rec Status was reviewed with the receiving nurse. Opportunity for questions and clarification was provided. Assessment completed upon patients arrival to unit and care assumed.

## 2017-08-05 NOTE — ED NOTES
Pt resting quietly in bed watching tv with mother and father at bedside with him. He has zero pain and states he is comfortable. Call bell within reach, Siderails up for safety, VSS on RA. No complaints or requests at this time.

## 2017-08-05 NOTE — PROGRESS NOTES
Pharmacy Dosing Services: Renal     Provider CINDA Coleman     The following medication: Zosyn was automatically dose-adjusted per THE [facility] P&T Committee Protocol, with respect to renal function. Consult provided for this   25 y.o. , male , for the indication of intra-abdominal infection. Pt Weight:   Wt Readings from Last 1 Encounters:   08/04/17 72.6 kg (160 lb) (57 %, Z= 0.17)*       * Growth percentiles are based on [facility] 2-20 Years data. Previous Regimen Zosyn 3.375 g IV every 8 hours   Serum Creatinine Lab Results   Component Value Date/Time    Creatinine 1.50 08/04/2017 06:06 PM       Creatinine Clearance Estimated Creatinine Clearance: 81.3 mL/min (based on Cr of 1.5). BUN Lab Results   Component Value Date/Time    BUN 11 08/04/2017 06:06 PM       Dosage changed to:  Zosyn 3.375 g IV every 6  hours    Pharmacy to continue to monitor patient daily. Will make dosage adjustments based upon changing renal function.   Pedro Marcum PHARMD. Contact information: [phone]

## 2017-08-05 NOTE — ED NOTES
Pt hourly rounding competed. Parents at bedside. VSS on RA. No pain or complaints at this time. Call bell within reach. Safety   Pt (x) resting on stretcher with side rails up and call bell in reach. () in chair    () in parents arms. Toileting   Pt offered ()Bedpan     ()Assistance to Restroom     (x)Urinal  Ongoing Updates  Updated on plan of care and status of test results.   Pain Management  Inquired as to comfort and offered comfort measures:    (x) warm blankets   (x) dimmed lights

## 2017-08-05 NOTE — ROUTINE PROCESS
TRANSFER - OUT REPORT:    Verbal report given to Edmund Boles RN (name) on Idris Castro  being transferred to med/surg(unit) for routine progression of care       Report consisted of patients Situation, Background, Assessment and   Recommendations(SBAR). MEWS score: 1 communicated with RN assuming care and all questions answered at this time at the bedside. Information from the following report(s) SBAR, Kardex, ED Summary, Procedure Summary, Intake/Output, MAR, Accordion, Recent Results, Med Rec Status, Cardiac Rhythm NSR and Alarm Parameters  was reviewed with the receiving nurse. Lines:   Peripheral IV 08/04/17 Left Antecubital (Active)   Site Assessment Clean, dry, & intact 8/4/2017  6:20 PM   Phlebitis Assessment 0 8/4/2017  6:20 PM   Infiltration Assessment 0 8/4/2017  6:20 PM   Dressing Status Clean, dry, & intact 8/4/2017  6:20 PM       Peripheral IV 08/04/17 Right Antecubital (Active)   Site Assessment Clean, dry, & intact 8/4/2017  6:24 PM   Phlebitis Assessment 0 8/4/2017  6:24 PM   Infiltration Assessment 0 8/4/2017  6:24 PM   Dressing Status Clean, dry, & intact 8/4/2017  6:24 PM        Opportunity for questions and clarification was provided.       Patient transported with:   Monitor  Tech

## 2017-08-10 LAB
BACTERIA SPEC CULT: NORMAL
SERVICE CMNT-IMP: NORMAL